# Patient Record
Sex: FEMALE | Race: WHITE | HISPANIC OR LATINO
[De-identification: names, ages, dates, MRNs, and addresses within clinical notes are randomized per-mention and may not be internally consistent; named-entity substitution may affect disease eponyms.]

---

## 2022-04-05 PROBLEM — Z00.00 ENCOUNTER FOR PREVENTIVE HEALTH EXAMINATION: Status: ACTIVE | Noted: 2022-04-05

## 2022-04-06 ENCOUNTER — APPOINTMENT (OUTPATIENT)
Dept: PEDIATRIC ORTHOPEDIC SURGERY | Facility: CLINIC | Age: 62
End: 2022-04-06
Payer: COMMERCIAL

## 2022-04-06 VITALS — HEIGHT: 58 IN | WEIGHT: 147.31 LBS | BODY MASS INDEX: 30.92 KG/M2

## 2022-04-06 PROCEDURE — 99202 OFFICE O/P NEW SF 15 MIN: CPT

## 2022-04-06 PROCEDURE — 73630 X-RAY EXAM OF FOOT: CPT | Mod: 26

## 2022-04-06 PROCEDURE — 73610 X-RAY EXAM OF ANKLE: CPT | Mod: 26

## 2022-04-06 RX ORDER — DICLOFENAC SODIUM 75 MG/1
75 TABLET, DELAYED RELEASE ORAL TWICE DAILY
Qty: 60 | Refills: 1 | Status: ACTIVE | COMMUNITY
Start: 2022-04-06 | End: 1900-01-01

## 2022-04-06 RX ORDER — MULTIVIT-MIN/IRON/FOLIC ACID/K 18-600-40
CAPSULE ORAL
Refills: 0 | Status: ACTIVE | COMMUNITY

## 2022-04-06 RX ORDER — SIMVASTATIN 80 MG/1
TABLET, FILM COATED ORAL
Refills: 0 | Status: ACTIVE | COMMUNITY

## 2022-04-06 RX ORDER — EZETIMIBE 10 MG/1
10 TABLET ORAL
Refills: 0 | Status: ACTIVE | COMMUNITY

## 2022-04-06 NOTE — CONSULT LETTER
[Dear  ___] : Dear  [unfilled], [Consult Letter:] : I had the pleasure of evaluating your patient, [unfilled]. [Please see my note below.] : Please see my note below. [Consult Closing:] : Thank you very much for allowing me to participate in the care of this patient.  If you have any questions, please do not hesitate to contact me. [Sincerely,] : Sincerely, [FreeTextEntry3] : Dr Eamon Ling JR.\par

## 2022-04-06 NOTE — HISTORY OF PRESENT ILLNESS
[FreeTextEntry1] : This 62-year-old pleasant lady is seen for evaluation of the left ankle and foot.  She states in January of this year ago slightly more she had a traumatic injury where she inverted the foot and ankle causing pain swelling and limp.  She did have x-rays in [place] which was said to be negative.  On her return to the [place] she has continued to have pain stiffness and difficulty bearing weight though slowly improving.  She did have x-rays 1 month ago at [facility] of the ankle and foot.  She has been using a soft ankle support.  She has no locking buckling or sensation of instability.  She was just recently prescribed physical therapy which is scheduled to start this week.  Prior to this no complaints.  Past history reveals hypertension increased lipids for which she is on Zetia and simvastatin

## 2022-04-06 NOTE — PHYSICAL EXAM
[FreeTextEntry1] : On exam today she has an antalgic gait on the left side.  She has mild swelling to the ankle and foot.  Good motion to the ankle and subtalar joint she has mild tenderness over the anterior talofibular ligament none so about either malleolus.  With regards to the foot she is tender about the lateral midfoot region.  There is no instability on stress of either the ankle or foot.  Compartments are soft neurovascular status is intact.\par \par Review of x-rays from [facility] from March 4, 2022 3 views of the ankle 3 views of the foot are negative for fracture or instability pattern

## 2022-04-06 NOTE — ASSESSMENT
[FreeTextEntry1] : Impression: Sprain left ankle/foot.\par \par She has been placed on diclofenac she will follow through with the physical therapy which has just recently been prescribed.  I will see her in 1 month.

## 2022-05-04 ENCOUNTER — APPOINTMENT (OUTPATIENT)
Dept: PEDIATRIC ORTHOPEDIC SURGERY | Facility: CLINIC | Age: 62
End: 2022-05-04
Payer: COMMERCIAL

## 2022-05-04 VITALS — BODY MASS INDEX: 30.92 KG/M2 | HEIGHT: 58 IN | WEIGHT: 147.31 LBS

## 2022-05-04 PROCEDURE — 99212 OFFICE O/P EST SF 10 MIN: CPT

## 2022-05-04 RX ORDER — DICLOFENAC SODIUM 75 MG/1
75 TABLET, DELAYED RELEASE ORAL TWICE DAILY
Qty: 60 | Refills: 1 | Status: ACTIVE | COMMUNITY
Start: 2022-05-04 | End: 1900-01-01

## 2022-05-04 NOTE — HISTORY OF PRESENT ILLNESS
[de-identified] : This 62-year-old returns for follow-up of her left ankle and foot.  She is in physical therapy she is also using an ASO.  She notes some improvement with her current regimen

## 2022-05-04 NOTE — PHYSICAL EXAM
[de-identified] : Her exam reveals less swelling to the ankle and foot she still has significant tenderness over the lateral ligaments with a mildly positive anterior drawer and painful inversion stress test.  Less discomfort to the lateral midfoot.

## 2022-06-01 ENCOUNTER — APPOINTMENT (OUTPATIENT)
Dept: PEDIATRIC ORTHOPEDIC SURGERY | Facility: CLINIC | Age: 62
End: 2022-06-01
Payer: COMMERCIAL

## 2022-06-06 ENCOUNTER — APPOINTMENT (OUTPATIENT)
Dept: PEDIATRIC ORTHOPEDIC SURGERY | Facility: CLINIC | Age: 62
End: 2022-06-06
Payer: COMMERCIAL

## 2022-06-06 VITALS — WEIGHT: 147 LBS | BODY MASS INDEX: 30.86 KG/M2 | HEIGHT: 58 IN

## 2022-06-06 DIAGNOSIS — S93.622A SPRAIN OF TARSOMETATARSAL LIGAMENT OF LEFT FOOT, INITIAL ENCOUNTER: ICD-10-CM

## 2022-06-06 DIAGNOSIS — S93.492A SPRAIN OF OTHER LIGAMENT OF LEFT ANKLE, INITIAL ENCOUNTER: ICD-10-CM

## 2022-06-06 PROCEDURE — 99212 OFFICE O/P EST SF 10 MIN: CPT

## 2022-06-06 NOTE — HISTORY OF PRESENT ILLNESS
[de-identified] : This patient returns for reevaluation of her left ankle and foot she is made significant progress with therapy and nonsteroidals.  No significant complaints at this time

## 2022-06-06 NOTE — PHYSICAL EXAM
[de-identified] : On exam she has a normal gait with excellent motion to the ankle subtalar joint no tenderness throughout no instability.

## 2022-10-21 ENCOUNTER — OFFICE (OUTPATIENT)
Dept: URBAN - METROPOLITAN AREA CLINIC 86 | Facility: CLINIC | Age: 62
Setting detail: OPHTHALMOLOGY
End: 2022-10-21
Payer: MEDICARE

## 2022-10-21 ENCOUNTER — RX ONLY (RX ONLY)
Age: 62
End: 2022-10-21

## 2022-10-21 DIAGNOSIS — H01.001: ICD-10-CM

## 2022-10-21 DIAGNOSIS — H01.004: ICD-10-CM

## 2022-10-21 DIAGNOSIS — H16.223: ICD-10-CM

## 2022-10-21 PROCEDURE — 99213 OFFICE O/P EST LOW 20 MIN: CPT | Performed by: OPHTHALMOLOGY

## 2022-10-21 ASSESSMENT — VISUAL ACUITY
OD_BCVA: 20/30+1
OS_BCVA: 20/20

## 2022-10-21 ASSESSMENT — LID EXAM ASSESSMENTS
OD_BLEPHARITIS: 1+
OS_BLEPHARITIS: 1+

## 2022-10-21 ASSESSMENT — TONOMETRY
OD_IOP_MMHG: 16
OS_IOP_MMHG: 16

## 2022-10-21 ASSESSMENT — REFRACTION_CURRENTRX
OD_OVR_VA: 20/
OS_CYLINDER: SPHERE
OD_SPHERE: +3.00
OS_OVR_VA: 20/
OD_CYLINDER: SPHERE
OS_SPHERE: +3.00

## 2022-10-21 ASSESSMENT — CONFRONTATIONAL VISUAL FIELD TEST (CVF)
OS_FINDINGS: FULL
OD_FINDINGS: FULL

## 2022-10-21 ASSESSMENT — TEAR BREAK UP TIME (TBUT)
OD_TBUT: 1+
OS_TBUT: 1+

## 2023-03-08 ENCOUNTER — OFFICE (OUTPATIENT)
Dept: URBAN - METROPOLITAN AREA CLINIC 86 | Facility: CLINIC | Age: 63
Setting detail: OPHTHALMOLOGY
End: 2023-03-08
Payer: MEDICARE

## 2023-03-08 DIAGNOSIS — H16.223: ICD-10-CM

## 2023-03-08 DIAGNOSIS — H25.13: ICD-10-CM

## 2023-03-08 DIAGNOSIS — H40.033: ICD-10-CM

## 2023-03-08 PROCEDURE — 92020 GONIOSCOPY: CPT | Performed by: OPHTHALMOLOGY

## 2023-03-08 PROCEDURE — 92014 COMPRE OPH EXAM EST PT 1/>: CPT | Performed by: OPHTHALMOLOGY

## 2023-03-08 ASSESSMENT — VISUAL ACUITY
OS_BCVA: 20/25
OD_BCVA: 20/25

## 2023-03-08 ASSESSMENT — CONFRONTATIONAL VISUAL FIELD TEST (CVF)
OD_FINDINGS: FULL
OS_FINDINGS: FULL

## 2023-03-08 ASSESSMENT — REFRACTION_CURRENTRX
OS_CYLINDER: SPHERE
OD_OVR_VA: 20/
OS_SPHERE: +3.00
OD_CYLINDER: SPHERE
OS_OVR_VA: 20/
OD_SPHERE: +3.00

## 2023-03-08 ASSESSMENT — TONOMETRY
OD_IOP_MMHG: 17
OS_IOP_MMHG: 17

## 2023-03-08 ASSESSMENT — TEAR BREAK UP TIME (TBUT)
OS_TBUT: 1+
OD_TBUT: 1+

## 2023-04-04 ENCOUNTER — APPOINTMENT (OUTPATIENT)
Dept: PEDIATRIC ORTHOPEDIC SURGERY | Facility: CLINIC | Age: 63
End: 2023-04-04
Payer: COMMERCIAL

## 2023-04-04 VITALS
SYSTOLIC BLOOD PRESSURE: 130 MMHG | DIASTOLIC BLOOD PRESSURE: 82 MMHG | WEIGHT: 150 LBS | BODY MASS INDEX: 31.49 KG/M2 | HEIGHT: 58 IN | TEMPERATURE: 97 F

## 2023-04-04 PROCEDURE — 73564 X-RAY EXAM KNEE 4 OR MORE: CPT | Mod: 26

## 2023-04-04 PROCEDURE — 99213 OFFICE O/P EST LOW 20 MIN: CPT

## 2023-04-04 RX ORDER — DICLOFENAC SODIUM 75 MG/1
75 TABLET, DELAYED RELEASE ORAL TWICE DAILY
Qty: 60 | Refills: 1 | Status: ACTIVE | COMMUNITY
Start: 2023-04-04 | End: 1900-01-01

## 2023-04-04 NOTE — ASSESSMENT
[FreeTextEntry1] : Impression: Internal derangement left knee.\par \par This patient will be treated with diclofenac with GI precautions.  Range of motion exercises ice/hot packs with home exercise program.  Physical therapy has been ordered she will progressively weight-bear as tolerated with her crutches she will return in 3-4 weeks for follow-up

## 2023-04-04 NOTE — PHYSICAL EXAM
[de-identified] : Exam today reveals an antalgic gait with limp she has a good motion to the left hip the left knee slight restriction of full flexion of approximately 10 degrees.  There is a moderate effusion present.  No obvious instability on stress of the cruciate/collateral ligaments.  She is tender more so in the lateral compartment and joint line negative Steinmann.  No significant pain on patellofemoral grind.  Popliteal fossa calf neuro vas exam are unremarkable.\par \par Review of x-rays from [facility] 4 views of the left knee taken March 31, 2023 are negative for fracture mild arthritic changes noted

## 2023-04-04 NOTE — HISTORY OF PRESENT ILLNESS
[de-identified] : This 63-year-old is seen today for evaluation of the left knee.  She was well until 4 days ago when she tripped getting all out of a chair sustaining injury.  This precipitated significant pain stiffness swelling and difficulty bearing weight.  She was seen at [facility] emergency room where she was sent home on crutches.  She is still uncomfortable.  Prior to this she had been doing well.  Past history since last seen has been unremarkable

## 2023-04-27 ENCOUNTER — APPOINTMENT (OUTPATIENT)
Dept: PEDIATRIC ORTHOPEDIC SURGERY | Facility: CLINIC | Age: 63
End: 2023-04-27

## 2023-05-01 ENCOUNTER — APPOINTMENT (OUTPATIENT)
Dept: PEDIATRIC ORTHOPEDIC SURGERY | Facility: CLINIC | Age: 63
End: 2023-05-01
Payer: COMMERCIAL

## 2023-05-01 VITALS — WEIGHT: 150 LBS | BODY MASS INDEX: 31.49 KG/M2 | TEMPERATURE: 97.6 F | HEIGHT: 58 IN

## 2023-05-01 PROCEDURE — 99213 OFFICE O/P EST LOW 20 MIN: CPT | Mod: 25

## 2023-05-01 PROCEDURE — 20610 DRAIN/INJ JOINT/BURSA W/O US: CPT

## 2023-05-01 NOTE — PROCEDURE
[FreeTextEntry1] : Under sterile technique with a Betadine prep the left knee was injected from a medial portal with 40 mg of methylprednisolone and 2 cc of 1% lidocaine with a Band-Aid dressing applied at the conclusion this was tolerated without incident

## 2023-05-01 NOTE — ASSESSMENT
[FreeTextEntry1] : Impression: Internal derangement left knee.\par \par I have injected the knee she will continue with nonsteroidals and ongoing physical therapy.  She will return in 4 weeks if she continues to be symptomatic MRI will be ordered

## 2023-05-01 NOTE — PHYSICAL EXAM
[de-identified] : Her exam reveals an obvious limp antalgic in nature on the left side she has a moderate effusion to the knee slight restriction of full flexion pain in the medial compartment posterior medial joint line with a mildly positive Steinmann.  No evidence of phlebitis.

## 2023-05-01 NOTE — HISTORY OF PRESENT ILLNESS
[de-identified] : This patient returns for reevaluation of the left knee she is using 1 crutch now she still however has pain though less so while on the nonsteroidals.  No locking or buckling

## 2023-05-30 ENCOUNTER — APPOINTMENT (OUTPATIENT)
Dept: PEDIATRIC ORTHOPEDIC SURGERY | Facility: CLINIC | Age: 63
End: 2023-05-30
Payer: COMMERCIAL

## 2023-05-30 VITALS
DIASTOLIC BLOOD PRESSURE: 84 MMHG | TEMPERATURE: 97.5 F | WEIGHT: 150 LBS | HEIGHT: 58 IN | SYSTOLIC BLOOD PRESSURE: 132 MMHG | BODY MASS INDEX: 31.49 KG/M2

## 2023-05-30 PROCEDURE — 99212 OFFICE O/P EST SF 10 MIN: CPT

## 2023-05-30 RX ORDER — DICLOFENAC SODIUM 75 MG/1
75 TABLET, DELAYED RELEASE ORAL TWICE DAILY
Qty: 60 | Refills: 1 | Status: ACTIVE | COMMUNITY
Start: 2023-05-30 | End: 1900-01-01

## 2023-05-30 NOTE — HISTORY OF PRESENT ILLNESS
[de-identified] : This patient returns for reevaluation of her left knee she is improving nicely with therapy and medications

## 2023-05-30 NOTE — ASSESSMENT
[FreeTextEntry1] : Impression: Internal derangement left knee.\par \par She will continue with diclofenac and a home exercise program I will see her in 3 weeks

## 2023-05-30 NOTE — PHYSICAL EXAM
[de-identified] : On exam she is able to walk without limp minimal effusion to the knee with full motion and no instability on stress and much less tenderness in the medial compartment and joint line negative Steinmann

## 2023-06-20 ENCOUNTER — APPOINTMENT (OUTPATIENT)
Dept: PEDIATRIC ORTHOPEDIC SURGERY | Facility: CLINIC | Age: 63
End: 2023-06-20
Payer: COMMERCIAL

## 2023-06-20 VITALS
BODY MASS INDEX: 31.49 KG/M2 | SYSTOLIC BLOOD PRESSURE: 130 MMHG | TEMPERATURE: 96.8 F | HEIGHT: 58 IN | DIASTOLIC BLOOD PRESSURE: 78 MMHG | WEIGHT: 150 LBS

## 2023-06-20 DIAGNOSIS — M23.8X2 OTHER INTERNAL DERANGEMENTS OF LEFT KNEE: ICD-10-CM

## 2023-06-20 PROCEDURE — 99212 OFFICE O/P EST SF 10 MIN: CPT

## 2023-06-20 NOTE — PHYSICAL EXAM
[de-identified] : Exam today reveals a normal gait she has excellent motion to the left knee minimal effusion no significant tenderness to palpation no instability on stress

## 2023-06-20 NOTE — ASSESSMENT
[FreeTextEntry1] : Pression: Internal derangement left knee much improved.\par \par She will return on a as needed basis

## 2023-06-20 NOTE — HISTORY OF PRESENT ILLNESS
[de-identified] : This patient returns she is significantly improved with regards to her left knee she has done well with physical therapy minimal discomfort only

## 2024-03-06 ENCOUNTER — OFFICE (OUTPATIENT)
Dept: URBAN - METROPOLITAN AREA CLINIC 86 | Facility: CLINIC | Age: 64
Setting detail: OPHTHALMOLOGY
End: 2024-03-06
Payer: MEDICAID

## 2024-03-06 DIAGNOSIS — H40.033: ICD-10-CM

## 2024-03-06 DIAGNOSIS — H52.4: ICD-10-CM

## 2024-03-06 DIAGNOSIS — H25.13: ICD-10-CM

## 2024-03-06 DIAGNOSIS — H16.223: ICD-10-CM

## 2024-03-06 PROCEDURE — 92014 COMPRE OPH EXAM EST PT 1/>: CPT | Performed by: OPHTHALMOLOGY

## 2024-03-06 PROCEDURE — 92015 DETERMINE REFRACTIVE STATE: CPT | Performed by: OPHTHALMOLOGY

## 2024-03-06 ASSESSMENT — REFRACTION_MANIFEST
OD_ADD: +2.25
OD_SPHERE: +3.00
OS_VA1: 20/20
OS_ADD: +2.25
OD_VA1: 20/20
OS_SPHERE: +3.00

## 2024-03-06 ASSESSMENT — REFRACTION_CURRENTRX
OS_CYLINDER: SPHERE
OD_SPHERE: +5.25
OD_CYLINDER: SPHERE
OD_VPRISM_DIRECTION: SV
OD_SPHERE: +3.00
OD_OVR_VA: 20/
OS_VPRISM_DIRECTION: SV
OS_OVR_VA: 20/
OD_VPRISM_DIRECTION: SV
OS_VPRISM_DIRECTION: SV
OD_OVR_VA: 20/
OS_OVR_VA: 20/
OS_SPHERE: +3.00
OS_SPHERE: +5.25

## 2025-04-01 ENCOUNTER — OFFICE (OUTPATIENT)
Dept: URBAN - METROPOLITAN AREA CLINIC 86 | Facility: CLINIC | Age: 65
Setting detail: OPHTHALMOLOGY
End: 2025-04-01
Payer: MEDICARE

## 2025-04-01 DIAGNOSIS — H16.223: ICD-10-CM

## 2025-04-01 DIAGNOSIS — H25.13: ICD-10-CM

## 2025-04-01 DIAGNOSIS — H40.033: ICD-10-CM

## 2025-04-01 DIAGNOSIS — H52.4: ICD-10-CM

## 2025-04-01 PROCEDURE — 92020 GONIOSCOPY: CPT | Performed by: OPHTHALMOLOGY

## 2025-04-01 PROCEDURE — 92014 COMPRE OPH EXAM EST PT 1/>: CPT | Performed by: OPHTHALMOLOGY

## 2025-04-01 PROCEDURE — 92015 DETERMINE REFRACTIVE STATE: CPT | Performed by: OPHTHALMOLOGY

## 2025-04-01 ASSESSMENT — REFRACTION_CURRENTRX
OS_SPHERE: +3.00
OD_VPRISM_DIRECTION: SV
OS_VPRISM_DIRECTION: SV
OD_OVR_VA: 20/
OS_SPHERE: +5.25
OS_OVR_VA: 20/
OD_SPHERE: +3.00
OS_CYLINDER: SPHERE
OD_VPRISM_DIRECTION: SV
OD_CYLINDER: SPHERE
OS_VPRISM_DIRECTION: SV
OS_OVR_VA: 20/
OD_OVR_VA: 20/
OD_SPHERE: +5.25

## 2025-04-01 ASSESSMENT — CONFRONTATIONAL VISUAL FIELD TEST (CVF)
OD_FINDINGS: FULL
OS_FINDINGS: FULL

## 2025-04-01 ASSESSMENT — VISUAL ACUITY
OD_BCVA: 20/25
OS_BCVA: 20/25

## 2025-04-01 ASSESSMENT — REFRACTION_MANIFEST
OD_VA2: 20/20(J1+)
OS_ADD: +2.50
OD_SPHERE: +3.00
OU_VA: 20/20
OS_VA2: 20/20(J1+)
OS_SPHERE: +3.00
OD_VA1: 20/25
OS_VA1: 20/25
OD_ADD: +2.50

## 2025-04-01 ASSESSMENT — TEAR BREAK UP TIME (TBUT)
OD_TBUT: 1+
OS_TBUT: 1+

## 2025-04-01 ASSESSMENT — TONOMETRY
OD_IOP_MMHG: 14
OS_IOP_MMHG: 14